# Patient Record
Sex: MALE | Race: ASIAN | NOT HISPANIC OR LATINO
[De-identification: names, ages, dates, MRNs, and addresses within clinical notes are randomized per-mention and may not be internally consistent; named-entity substitution may affect disease eponyms.]

---

## 2019-01-17 ENCOUNTER — APPOINTMENT (OUTPATIENT)
Dept: INTERNAL MEDICINE | Facility: CLINIC | Age: 29
End: 2019-01-17

## 2019-04-16 ENCOUNTER — APPOINTMENT (OUTPATIENT)
Dept: INTERNAL MEDICINE | Facility: CLINIC | Age: 29
End: 2019-04-16
Payer: COMMERCIAL

## 2019-04-16 ENCOUNTER — TRANSCRIPTION ENCOUNTER (OUTPATIENT)
Age: 29
End: 2019-04-16

## 2019-04-16 VITALS
SYSTOLIC BLOOD PRESSURE: 110 MMHG | BODY MASS INDEX: 27.43 KG/M2 | DIASTOLIC BLOOD PRESSURE: 72 MMHG | TEMPERATURE: 98.8 F | OXYGEN SATURATION: 92 % | WEIGHT: 181 LBS | HEART RATE: 92 BPM | HEIGHT: 68 IN | RESPIRATION RATE: 16 BRPM

## 2019-04-16 DIAGNOSIS — R21 RASH AND OTHER NONSPECIFIC SKIN ERUPTION: ICD-10-CM

## 2019-04-16 DIAGNOSIS — Z78.9 OTHER SPECIFIED HEALTH STATUS: ICD-10-CM

## 2019-04-16 DIAGNOSIS — J06.9 ACUTE UPPER RESPIRATORY INFECTION, UNSPECIFIED: ICD-10-CM

## 2019-04-16 PROCEDURE — 99204 OFFICE O/P NEW MOD 45 MIN: CPT

## 2019-04-16 PROCEDURE — 36415 COLL VENOUS BLD VENIPUNCTURE: CPT

## 2019-04-16 RX ORDER — FLUTICASONE FUROATE 27.5 UG/1
27.5 SPRAY, METERED NASAL DAILY
Qty: 1 | Refills: 3 | Status: ACTIVE | COMMUNITY
Start: 2019-04-16 | End: 1900-01-01

## 2019-04-16 NOTE — PLAN
[FreeTextEntry1] : Rash -etiology unclear=t rial of triamciolon and dermr eferral\par \par URI- rx for lonase allergic componoent and monitor\par \par PREP- std testing today\par f/up q 3 months office policy reviewed

## 2019-04-16 NOTE — HISTORY OF PRESENT ILLNESS
[de-identified] : THis is a 29 yo M interested in PREP.  Was on it one year ago- but had to stop due to insurance. \par \par Has sinus itching-  sneezes a lot.  cough.  has been for one week.  No fever or chills. - never had this ebfore.     no head pain  some cough. \par took nyquil last night\par Rash on stomach and thighs. and now on calf  slightly raised, streaking -   4 weeks.   no itching - Did travelto [place] in February- This began while he was away.

## 2019-04-16 NOTE — PHYSICAL EXAM
[No Acute Distress] : no acute distress [Well Nourished] : well nourished [Well Developed] : well developed [Normal Outer Ear/Nose] : the outer ears and nose were normal in appearance [Normal Oropharynx] : the oropharynx was normal [No JVD] : no jugular venous distention [Supple] : supple [No Lymphadenopathy] : no lymphadenopathy [Normal Rate] : normal rate  [Regular Rhythm] : with a regular rhythm [Normal S1, S2] : normal S1 and S2 [Normal Affect] : the affect was normal [Normal Insight/Judgement] : insight and judgment were intact [de-identified] : slightly raised streaking bl abdomen, b/l thighs and left calf erythmatous,

## 2019-04-16 NOTE — REVIEW OF SYSTEMS
[Fever] : no fever [Chills] : no chills [Earache] : no earache [Fatigue] : no fatigue [Hearing Loss] : no hearing loss [Nasal Discharge] : nasal discharge [Sore Throat] : no sore throat [Wheezing] : no wheezing [Shortness Of Breath] : no shortness of breath [Negative] : Cardiovascular [Cough] : cough

## 2019-04-17 LAB
ANION GAP SERPL CALC-SCNC: 14 MMOL/L
BUN SERPL-MCNC: 14 MG/DL
C TRACH RRNA SPEC QL NAA+PROBE: NOT DETECTED
CALCIUM SERPL-MCNC: 9.6 MG/DL
CHLORIDE SERPL-SCNC: 102 MMOL/L
CO2 SERPL-SCNC: 22 MMOL/L
CREAT SERPL-MCNC: 0.86 MG/DL
GLUCOSE SERPL-MCNC: 86 MG/DL
HIV1+2 AB SPEC QL IA.RAPID: NONREACTIVE
N GONORRHOEA RRNA SPEC QL NAA+PROBE: NOT DETECTED
POTASSIUM SERPL-SCNC: 4.3 MMOL/L
SODIUM SERPL-SCNC: 138 MMOL/L
SOURCE AMPLIFICATION: NORMAL
T PALLIDUM AB SER QL IA: NEGATIVE

## 2019-04-22 ENCOUNTER — TRANSCRIPTION ENCOUNTER (OUTPATIENT)
Age: 29
End: 2019-04-22

## 2019-04-22 RX ORDER — TRIAMCINOLONE ACETONIDE 1 MG/G
0.1 CREAM TOPICAL 3 TIMES DAILY
Qty: 1 | Refills: 2 | Status: ACTIVE | COMMUNITY
Start: 2019-04-16 | End: 1900-01-01

## 2019-07-08 ENCOUNTER — RX RENEWAL (OUTPATIENT)
Age: 29
End: 2019-07-08

## 2019-07-30 ENCOUNTER — APPOINTMENT (OUTPATIENT)
Dept: INTERNAL MEDICINE | Facility: CLINIC | Age: 29
End: 2019-07-30
Payer: COMMERCIAL

## 2019-07-30 VITALS
HEART RATE: 90 BPM | WEIGHT: 166 LBS | TEMPERATURE: 98.7 F | DIASTOLIC BLOOD PRESSURE: 70 MMHG | BODY MASS INDEX: 25.16 KG/M2 | SYSTOLIC BLOOD PRESSURE: 108 MMHG | HEIGHT: 68 IN | OXYGEN SATURATION: 98 % | RESPIRATION RATE: 16 BRPM

## 2019-07-30 DIAGNOSIS — Z11.3 ENCOUNTER FOR SCREENING FOR INFECTIONS WITH A PREDOMINANTLY SEXUAL MODE OF TRANSMISSION: ICD-10-CM

## 2019-07-30 PROCEDURE — 36415 COLL VENOUS BLD VENIPUNCTURE: CPT

## 2019-07-30 PROCEDURE — 99213 OFFICE O/P EST LOW 20 MIN: CPT

## 2019-07-30 NOTE — PLAN
[FreeTextEntry1] : reviewed need for compliance for PREP to be effective\par std testing\par  f/up 3 mo

## 2019-07-30 NOTE — HISTORY OF PRESENT ILLNESS
[FreeTextEntry1] : prep f/up [de-identified] : admittedly not compliant with PREP\par Partner HIV + but undetectable

## 2019-07-31 LAB
ANION GAP SERPL CALC-SCNC: 11 MMOL/L
BUN SERPL-MCNC: 14 MG/DL
CALCIUM SERPL-MCNC: 9.7 MG/DL
CHLORIDE SERPL-SCNC: 104 MMOL/L
CO2 SERPL-SCNC: 25 MMOL/L
CREAT SERPL-MCNC: 0.87 MG/DL
GLUCOSE SERPL-MCNC: 102 MG/DL
HIV1+2 AB SPEC QL IA.RAPID: NONREACTIVE
POTASSIUM SERPL-SCNC: 4.2 MMOL/L
SODIUM SERPL-SCNC: 140 MMOL/L
T PALLIDUM AB SER QL IA: NEGATIVE

## 2019-08-01 LAB
C TRACH RRNA SPEC QL NAA+PROBE: NOT DETECTED
N GONORRHOEA RRNA SPEC QL NAA+PROBE: NOT DETECTED
SOURCE AMPLIFICATION: NORMAL

## 2019-08-12 ENCOUNTER — RX RENEWAL (OUTPATIENT)
Age: 29
End: 2019-08-12

## 2019-10-15 ENCOUNTER — APPOINTMENT (OUTPATIENT)
Dept: INTERNAL MEDICINE | Facility: CLINIC | Age: 29
End: 2019-10-15
Payer: COMMERCIAL

## 2019-10-15 ENCOUNTER — LABORATORY RESULT (OUTPATIENT)
Age: 29
End: 2019-10-15

## 2019-10-15 VITALS
DIASTOLIC BLOOD PRESSURE: 76 MMHG | BODY MASS INDEX: 25.16 KG/M2 | WEIGHT: 166 LBS | TEMPERATURE: 98.9 F | SYSTOLIC BLOOD PRESSURE: 110 MMHG | OXYGEN SATURATION: 98 % | HEIGHT: 68 IN | HEART RATE: 89 BPM | RESPIRATION RATE: 16 BRPM

## 2019-10-15 DIAGNOSIS — Z23 ENCOUNTER FOR IMMUNIZATION: ICD-10-CM

## 2019-10-15 DIAGNOSIS — Z00.00 ENCOUNTER FOR GENERAL ADULT MEDICAL EXAMINATION W/OUT ABNORMAL FINDINGS: ICD-10-CM

## 2019-10-15 PROCEDURE — 99213 OFFICE O/P EST LOW 20 MIN: CPT | Mod: 25

## 2019-10-15 PROCEDURE — G0008: CPT | Mod: RT

## 2019-10-15 PROCEDURE — 90688 IIV4 VACCINE SPLT 0.5 ML IM: CPT

## 2019-10-15 PROCEDURE — 90715 TDAP VACCINE 7 YRS/> IM: CPT

## 2019-10-15 PROCEDURE — 36415 COLL VENOUS BLD VENIPUNCTURE: CPT

## 2019-10-15 PROCEDURE — 90472 IMMUNIZATION ADMIN EACH ADD: CPT

## 2019-10-15 NOTE — PLAN
[FreeTextEntry1] : Continue Truvada - reviewed need for compliance\par bmp and std testing today\par flu shot and tdap\par check hep profile

## 2019-10-15 NOTE — HISTORY OF PRESENT ILLNESS
[FreeTextEntry1] : prep f/up [de-identified] : Trying to be more compliant with PREP\par Partner HIV + but undetectable\par traveling to [place], int in vaccines

## 2019-10-16 LAB
ANION GAP SERPL CALC-SCNC: 13 MMOL/L
BUN SERPL-MCNC: 17 MG/DL
C TRACH RRNA SPEC QL NAA+PROBE: NOT DETECTED
CALCIUM SERPL-MCNC: 9.9 MG/DL
CHLORIDE SERPL-SCNC: 103 MMOL/L
CO2 SERPL-SCNC: 24 MMOL/L
CREAT SERPL-MCNC: 0.87 MG/DL
GLUCOSE SERPL-MCNC: 97 MG/DL
HBV SURFACE AB SER QL: NONREACTIVE
HBV SURFACE AG SER QL: NONREACTIVE
HCV AB SER QL: NONREACTIVE
HCV S/CO RATIO: 0.13 S/CO
HEPATITIS A IGG ANTIBODY: REACTIVE
HIV1+2 AB SPEC QL IA.RAPID: NONREACTIVE
N GONORRHOEA RRNA SPEC QL NAA+PROBE: NOT DETECTED
POTASSIUM SERPL-SCNC: 4.5 MMOL/L
SODIUM SERPL-SCNC: 140 MMOL/L
SOURCE AMPLIFICATION: NORMAL
T PALLIDUM AB SER QL IA: NEGATIVE

## 2019-11-13 ENCOUNTER — RX RENEWAL (OUTPATIENT)
Age: 29
End: 2019-11-13

## 2020-01-07 ENCOUNTER — RX RENEWAL (OUTPATIENT)
Age: 30
End: 2020-01-07

## 2020-01-14 ENCOUNTER — APPOINTMENT (OUTPATIENT)
Dept: INTERNAL MEDICINE | Facility: CLINIC | Age: 30
End: 2020-01-14
Payer: COMMERCIAL

## 2020-01-14 ENCOUNTER — LABORATORY RESULT (OUTPATIENT)
Age: 30
End: 2020-01-14

## 2020-01-14 VITALS
TEMPERATURE: 98.6 F | WEIGHT: 170 LBS | BODY MASS INDEX: 25.76 KG/M2 | DIASTOLIC BLOOD PRESSURE: 60 MMHG | HEART RATE: 78 BPM | OXYGEN SATURATION: 98 % | SYSTOLIC BLOOD PRESSURE: 90 MMHG | HEIGHT: 68 IN | RESPIRATION RATE: 16 BRPM

## 2020-01-14 DIAGNOSIS — Z72.51 HIGH RISK HETEROSEXUAL BEHAVIOR: ICD-10-CM

## 2020-01-14 PROCEDURE — 36415 COLL VENOUS BLD VENIPUNCTURE: CPT

## 2020-01-14 PROCEDURE — 99213 OFFICE O/P EST LOW 20 MIN: CPT

## 2020-01-14 NOTE — PLAN
[FreeTextEntry1] : Continue Truvada - reviewed need for compliance\par bmp and std testing today\par \par f/up 3 months

## 2020-01-14 NOTE — HISTORY OF PRESENT ILLNESS
[FreeTextEntry1] : prep f/up [de-identified] : Doing well with PREP- compliant\par Partner HIV + but undetectable\par

## 2020-01-15 LAB
ANION GAP SERPL CALC-SCNC: 16 MMOL/L
BUN SERPL-MCNC: 13 MG/DL
C TRACH RRNA SPEC QL NAA+PROBE: NOT DETECTED
CALCIUM SERPL-MCNC: 9.3 MG/DL
CHLORIDE SERPL-SCNC: 103 MMOL/L
CO2 SERPL-SCNC: 22 MMOL/L
CREAT SERPL-MCNC: 0.85 MG/DL
GLUCOSE SERPL-MCNC: 88 MG/DL
HIV1+2 AB SPEC QL IA.RAPID: NONREACTIVE
N GONORRHOEA RRNA SPEC QL NAA+PROBE: NOT DETECTED
POTASSIUM SERPL-SCNC: 4.2 MMOL/L
SODIUM SERPL-SCNC: 141 MMOL/L
SOURCE AMPLIFICATION: NORMAL
T PALLIDUM AB SER QL IA: NEGATIVE

## 2020-02-10 ENCOUNTER — RX RENEWAL (OUTPATIENT)
Age: 30
End: 2020-02-10

## 2020-02-10 ENCOUNTER — TRANSCRIPTION ENCOUNTER (OUTPATIENT)
Age: 30
End: 2020-02-10

## 2020-02-10 RX ORDER — EMTRICITABINE AND TENOFOVIR DISOPROXIL FUMARATE 200; 300 MG/1; MG/1
200-300 TABLET, FILM COATED ORAL DAILY
Qty: 30 | Refills: 2 | Status: ACTIVE | COMMUNITY
Start: 2019-04-16 | End: 1900-01-01

## 2020-02-11 ENCOUNTER — TRANSCRIPTION ENCOUNTER (OUTPATIENT)
Age: 30
End: 2020-02-11

## 2020-02-13 ENCOUNTER — TRANSCRIPTION ENCOUNTER (OUTPATIENT)
Age: 30
End: 2020-02-13

## 2020-02-13 ENCOUNTER — APPOINTMENT (OUTPATIENT)
Dept: INTERNAL MEDICINE | Facility: CLINIC | Age: 30
End: 2020-02-13
Payer: COMMERCIAL

## 2020-02-13 DIAGNOSIS — Z23 ENCOUNTER FOR IMMUNIZATION: ICD-10-CM

## 2020-02-13 DIAGNOSIS — Z11.59 ENCOUNTER FOR SCREENING FOR OTHER VIRAL DISEASES: ICD-10-CM

## 2020-02-13 DIAGNOSIS — Z86.19 PERSONAL HISTORY OF OTHER INFECTIOUS AND PARASITIC DISEASES: ICD-10-CM

## 2020-02-13 PROCEDURE — 90471 IMMUNIZATION ADMIN: CPT

## 2020-02-13 PROCEDURE — 90746 HEPB VACCINE 3 DOSE ADULT IM: CPT

## 2020-02-13 PROCEDURE — 99212 OFFICE O/P EST SF 10 MIN: CPT | Mod: 25

## 2020-02-13 NOTE — HISTORY OF PRESENT ILLNESS
[de-identified] : Needs Hep B shot and labs, feeling well [FreeTextEntry1] : Here for labs and vaccine

## 2020-02-14 LAB
VZV AB TITR SER: POSITIVE
VZV IGG SER IF-ACNC: 2499 INDEX

## 2020-05-14 ENCOUNTER — APPOINTMENT (OUTPATIENT)
Dept: INTERNAL MEDICINE | Facility: CLINIC | Age: 30
End: 2020-05-14

## 2020-12-21 PROBLEM — J06.9 ACUTE URI: Status: RESOLVED | Noted: 2019-04-16 | Resolved: 2020-12-21

## 2021-06-04 ENCOUNTER — APPOINTMENT (OUTPATIENT)
Dept: INTERNAL MEDICINE | Facility: CLINIC | Age: 31
End: 2021-06-04
Payer: COMMERCIAL

## 2021-06-04 ENCOUNTER — LABORATORY RESULT (OUTPATIENT)
Age: 31
End: 2021-06-04

## 2021-06-04 DIAGNOSIS — R05 COUGH: ICD-10-CM

## 2021-06-04 PROCEDURE — 99213 OFFICE O/P EST LOW 20 MIN: CPT

## 2021-06-04 PROCEDURE — 99072 ADDL SUPL MATRL&STAF TM PHE: CPT

## 2021-06-04 RX ORDER — BENZONATATE 100 MG/1
100 CAPSULE ORAL
Qty: 30 | Refills: 3 | Status: ACTIVE | COMMUNITY
Start: 2021-06-04 | End: 1900-01-01

## 2021-06-04 NOTE — HISTORY OF PRESENT ILLNESS
[de-identified] : \par Cough for 4 weeks - dry. no sob. - inside and outside.\par - does get itching ilsa petting his dog.  \par Has questions regardingr PrEP- Partner is undetectable. VL

## 2021-06-04 NOTE — PLAN
[FreeTextEntry1] : Cough - benzonatate and encourage use of daily zyrtec and flonase\par \par will get back re: use of trubada will need bmp and sti testing first.

## 2021-06-04 NOTE — REVIEW OF SYSTEMS
[Wheezing] : no wheezing [Cough] : cough [Dyspnea on Exertion] : not dyspnea on exertion [Negative] : Gastrointestinal

## 2021-06-09 LAB
A ALTERNATA IGE QN: <0.1 KUA/L
A FUMIGATUS IGE QN: <0.1 KUA/L
BARLEY IGE QN: 0.5 KUA/L
BERMUDA GRASS IGE QN: 0.47 KUA/L
BOXELDER IGE QN: 0.91 KUA/L
C HERBARUM IGE QN: <0.1 KUA/L
CALIF WALNUT IGE QN: 2.28 KUA/L
CAT DANDER IGE QN: 0.15 KUA/L
CHERRY IGE QN: 0.51 KUA/L
CMN PIGWEED IGE QN: 0.72 KUA/L
COMMON RAGWEED IGE QN: 1.66 KUA/L
COTTONWOOD IGE QN: 0.48 KUA/L
COW MILK IGE QN: 0.1 KUA/L
CRAB IGE QN: 0.78 KUA/L
D FARINAE IGE QN: 1.03 KUA/L
D PTERONYSS IGE QN: 1.08 KUA/L
DEPRECATED A ALTERNATA IGE RAST QL: 0
DEPRECATED A FUMIGATUS IGE RAST QL: 0
DEPRECATED BARLEY IGE RAST QL: 1
DEPRECATED BERMUDA GRASS IGE RAST QL: 1
DEPRECATED BOXELDER IGE RAST QL: 2
DEPRECATED C HERBARUM IGE RAST QL: 0
DEPRECATED CAT DANDER IGE RAST QL: NORMAL
DEPRECATED CHERRY IGE RAST QL: 1
DEPRECATED COMMON PIGWEED IGE RAST QL: 2
DEPRECATED COMMON RAGWEED IGE RAST QL: 2
DEPRECATED COTTONWOOD IGE RAST QL: 1
DEPRECATED COW MILK IGE RAST QL: NORMAL
DEPRECATED CRAB IGE RAST QL: 2
DEPRECATED D FARINAE IGE RAST QL: 2
DEPRECATED D PTERONYSS IGE RAST QL: 2
DEPRECATED DOG DANDER IGE RAST QL: 3
DEPRECATED EGG WHITE IGE RAST QL: NORMAL
DEPRECATED GOOSEFOOT IGE RAST QL: 2
DEPRECATED LONDON PLANE IGE RAST QL: 1
DEPRECATED MOUSE URINE PROT IGE RAST QL: 0
DEPRECATED MUGWORT IGE RAST QL: 1
DEPRECATED OAT IGE RAST QL: 1
DEPRECATED P NOTATUM IGE RAST QL: 0
DEPRECATED PEANUT IGE RAST QL: 1
DEPRECATED RED CEDAR IGE RAST QL: 1
DEPRECATED ROACH IGE RAST QL: 1
DEPRECATED RYE IGE RAST QL: 1
DEPRECATED SHEEP SORREL IGE RAST QL: 1
DEPRECATED SILVER BIRCH IGE RAST QL: 2
DEPRECATED SOYBEAN IGE RAST QL: NORMAL
DEPRECATED TIMOTHY IGE RAST QL: 1
DEPRECATED WHEAT IGE RAST QL: 1
DEPRECATED WHITE ASH IGE RAST QL: 1
DEPRECATED WHITE OAK IGE RAST QL: 2
DOG DANDER IGE QN: 5.63 KUA/L
EGG WHITE IGE QN: 0.16 KUA/L
GOOSEFOOT IGE QN: 0.71 KUA/L
LONDON PLANE IGE QN: 0.65 KUA/L
MOUSE URINE PROT IGE QN: <0.1 KUA/L
MUGWORT IGE QN: 0.4 KUA/L
MULBERRY (T70) CLASS: 1
MULBERRY (T70) CONC: 0.36 KUA/L
OAT IGE QN: 0.66 KUA/L
P NOTATUM IGE QN: <0.1 KUA/L
PEANUT IGE QN: 0.64 KUA/L
RED CEDAR IGE QN: 0.37 KUA/L
ROACH IGE QN: 0.63 KUA/L
RYE IGE QN: 0.56 KUA/L
SHEEP SORREL IGE QN: 0.49 KUA/L
SILVER BIRCH IGE QN: 0.84 KUA/L
SOYBEAN IGE QN: 0.33 KUA/L
TIMOTHY IGE QN: 0.58 KUA/L
TOTAL IGE SMQN RAST: 159 KU/L
TREE ALLERG MIX1 IGE QL: 2
WHEAT IGE QN: 0.64 KUA/L
WHITE ASH IGE QN: 0.59 KUA/L
WHITE ELM IGE QN: 3
WHITE ELM IGE QN: 3.95 KUA/L
WHITE OAK IGE QN: 2.6 KUA/L